# Patient Record
(demographics unavailable — no encounter records)

---

## 2025-02-19 NOTE — DISCUSSION/SUMMARY
[FreeTextEntry1] : 29-year-old -0-0-1 presents with irregular menstrual cycle and PCOS.  Physical examination is normal.  Pap GC chlamydia sent.  Urine pregnancy test is negative and patient is restarted on birth control pills.  Patient to return in 4 to 6 weeks for endometrial sampling hysteroscopy.  All questions answered.

## 2025-02-19 NOTE — PHYSICAL EXAM
[Chaperone Present] : A chaperone was present in the examining room during all aspects of the physical examination [23793] : A chaperone was present during the pelvic exam. [FreeTextEntry2] : jeffery [Appropriately responsive] : appropriately responsive [Alert] : alert [No Acute Distress] : no acute distress [No Lymphadenopathy] : no lymphadenopathy [Regular Rate Rhythm] : regular rate rhythm [No Murmurs] : no murmurs [Clear to Auscultation B/L] : clear to auscultation bilaterally [Soft] : soft [Non-tender] : non-tender [Non-distended] : non-distended [No HSM] : No HSM [No Lesions] : no lesions [No Mass] : no mass [Oriented x3] : oriented x3 [Examination Of The Breasts] : a normal appearance [No Masses] : no breast masses were palpable [Labia Majora] : normal [Labia Minora] : normal [Normal] : normal [Uterine Adnexae] : normal

## 2025-02-19 NOTE — HISTORY OF PRESENT ILLNESS
[Y] : Positive pregnancy history [Menarche Age: ____] : age at menarche was [unfilled] [FreeTextEntry1] : 29-year-old -0-0-1 last menstrual cycle was in 2024 and.  Before that was 2024 presents with history of polycystic ovarian syndrome and irregular menstrual cycle.  She has been on birth control pill which she discontinued and wishes to go back on it.  Complains of acne. [PGxTotal] : 1 [[facility]] : 1 [PGHxPremature] : 0 [PGHxAbortions] : 0 [[facility] Campusiving] : 1 [PGHxABInduced] : 0 [PGHxABSpont] : 0 [PGHxEctopic] : 0 [PGHxMultBirths] : 0

## 2025-03-17 NOTE — PROCEDURE
[Hysteroscopy] : Hysteroscopy [Time out performed] : Pre-procedure time out performed.  Patient's name, date of birth and procedure confirmed. [Consent Obtained] : Consent obtained [Abnormal uterine bleeding] : abnormal uterine bleeding [Risks] : risks [Benefits] : benefits [Alternatives] : alternatives [Patient] : patient [Infection] : infection [Bleeding] : bleeding [Allergic Reaction] : allergic reaction [Lidocaine___ mL] : [unfilled] ~UmL of lidocaine [flexible] : Using aseptic technique a hysteroscopy was performed using a flexible hysteroscope [Sent to Pathology] : specimen was placed in buffered formalin and sent for pathology [Hemostasis obtained] : hemostasis obtained [Tolerated Well] : Patient tolerated the procedure well [Aftercare instructions/regstrictions given and follow-up scheduled] : Aftercare instructions/restrictions given and follow-up scheduled [Antibiotics given] : antibiotics not given [de-identified] : Under sterile condition and with paracervical block the cervix was dilated and endometrial sampling obtained and sent to pathology.  Hysteroscopic evaluation shows normal endometrial contour with lush features.  No polyps or myomas noted.  Patient tolerated the procedure well.

## 2025-04-14 NOTE — HISTORY OF PRESENT ILLNESS
[FreeTextEntry1] : 29-year-old -0-0-1 status post endometrial sampling for menometrorrhagia and pathology is benign.  Patient is presently using birth control pills and feels happy.

## 2025-04-14 NOTE — PLAN
[FreeTextEntry1] : 29-year-old -0-0-1 status post endometrial sampling hysteroscopy and pathology is benign.  Patient is on birth control pill and feels better.  All questions answered.  Return in 6 months for follow-up.